# Patient Record
Sex: MALE | Race: ASIAN | NOT HISPANIC OR LATINO
[De-identification: names, ages, dates, MRNs, and addresses within clinical notes are randomized per-mention and may not be internally consistent; named-entity substitution may affect disease eponyms.]

---
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
## 2023-03-08 ENCOUNTER — APPOINTMENT (OUTPATIENT)
Dept: PEDIATRIC NEUROLOGY | Facility: CLINIC | Age: 18
End: 2023-03-08
Payer: MEDICAID

## 2023-03-08 VITALS — BODY MASS INDEX: 37.35 KG/M2 | HEIGHT: 67 IN | WEIGHT: 238 LBS

## 2023-03-08 DIAGNOSIS — F72 SEVERE INTELLECTUAL DISABILITIES: ICD-10-CM

## 2023-03-08 DIAGNOSIS — F84.0 AUTISTIC DISORDER: ICD-10-CM

## 2023-03-08 DIAGNOSIS — R46.89 OTHER SYMPTOMS AND SIGNS INVOLVING APPEARANCE AND BEHAVIOR: ICD-10-CM

## 2023-03-08 PROCEDURE — 99213 OFFICE O/P EST LOW 20 MIN: CPT

## 2023-03-08 NOTE — ASSESSMENT
[FreeTextEntry1] : 16 yo M with autism and history of seizures, seizure-free for ~ 10 years , now off VPA after he developed pancreatitis in Oct 2015\par Now followed by psychiatrist who is managing his medication for his intermittent aggression, self-hurting and most recently, obsessive-compulsive behaviors \par Will continue to follow yearly to screen for problems associated with autism and seizures

## 2023-03-08 NOTE — PHYSICAL EXAM
[Well-appearing] : well-appearing [Alert] : alert [No facial asymmetry or weakness] : no facial asymmetry or weakness [Normal gait] : normal gait [de-identified] : poorly related [de-identified] : non-verbal [de-identified] : responds to name [de-identified] : normal functional strength observed in arm and legs

## 2023-03-08 NOTE — HISTORY OF PRESENT ILLNESS
[FreeTextEntry1] : 17 year old M with autism, prior history of seizures, followed mainly for behavior\par \par Over the past 6 months developed OCD behaviors including compulsive vomiting, moving furniture around and stacking them, insomnia\par - prozac was discontinued, switched with clonidine and clomipramine\par - trazadone was discontinued, switched to Zolpidem \par Not aggressive but has head-banging behavior when frustrated\par \par No seizures \par \par Followed by psychiatry  MARTIN\par Has a caregiver at home\par Parents looking for a residential program as he has not been accepted to [facility] b/c of his behaviors\par \par Meds\par benztropine 0.5 BID\par clonidine 0.1 mg BID\par Risperdal 2 mg BID\par clomipramine 50 mg daily\par zolpidem 10 mg qhs\par hydroxyzine prn for anxiety\par melatonin 10 mg at bedtime

## 2023-05-08 ENCOUNTER — EMERGENCY (EMERGENCY)
Facility: HOSPITAL | Age: 18
LOS: 1 days | Discharge: HOME | End: 2023-05-08
Attending: EMERGENCY MEDICINE | Admitting: EMERGENCY MEDICINE
Payer: MEDICAID

## 2023-05-08 VITALS
DIASTOLIC BLOOD PRESSURE: 96 MMHG | SYSTOLIC BLOOD PRESSURE: 175 MMHG | WEIGHT: 240.08 LBS | TEMPERATURE: 98 F | HEIGHT: 68 IN | HEART RATE: 149 BPM | OXYGEN SATURATION: 97 % | RESPIRATION RATE: 23 BRPM

## 2023-05-08 VITALS
RESPIRATION RATE: 19 BRPM | SYSTOLIC BLOOD PRESSURE: 154 MMHG | DIASTOLIC BLOOD PRESSURE: 68 MMHG | OXYGEN SATURATION: 98 % | HEART RATE: 98 BPM

## 2023-05-08 LAB
ALBUMIN SERPL ELPH-MCNC: 4.1 G/DL — SIGNIFICANT CHANGE UP (ref 3.3–5)
ALP SERPL-CCNC: 74 U/L — SIGNIFICANT CHANGE UP (ref 40–120)
ALT FLD-CCNC: 62 U/L — HIGH (ref 10–45)
ANION GAP SERPL CALC-SCNC: 10 MMOL/L — SIGNIFICANT CHANGE UP (ref 5–17)
AST SERPL-CCNC: 35 U/L — SIGNIFICANT CHANGE UP (ref 10–40)
BASOPHILS # BLD AUTO: 0.02 K/UL — SIGNIFICANT CHANGE UP (ref 0–0.2)
BASOPHILS NFR BLD AUTO: 0.2 % — SIGNIFICANT CHANGE UP (ref 0–2)
BILIRUB SERPL-MCNC: 0.5 MG/DL — SIGNIFICANT CHANGE UP (ref 0.2–1.2)
BUN SERPL-MCNC: 6 MG/DL — LOW (ref 7–23)
CALCIUM SERPL-MCNC: 9.1 MG/DL — SIGNIFICANT CHANGE UP (ref 8.4–10.5)
CHLORIDE SERPL-SCNC: 100 MMOL/L — SIGNIFICANT CHANGE UP (ref 96–108)
CO2 SERPL-SCNC: 27 MMOL/L — SIGNIFICANT CHANGE UP (ref 22–31)
CREAT SERPL-MCNC: 1.02 MG/DL — SIGNIFICANT CHANGE UP (ref 0.5–1.3)
EGFR: 109 ML/MIN/1.73M2 — SIGNIFICANT CHANGE UP
EOSINOPHIL # BLD AUTO: 0.11 K/UL — SIGNIFICANT CHANGE UP (ref 0–0.5)
EOSINOPHIL NFR BLD AUTO: 1.3 % — SIGNIFICANT CHANGE UP (ref 0–6)
GLUCOSE SERPL-MCNC: 88 MG/DL — SIGNIFICANT CHANGE UP (ref 70–99)
HCT VFR BLD CALC: 41.7 % — SIGNIFICANT CHANGE UP (ref 39–50)
HGB BLD-MCNC: 14 G/DL — SIGNIFICANT CHANGE UP (ref 13–17)
IMM GRANULOCYTES NFR BLD AUTO: 0.5 % — SIGNIFICANT CHANGE UP (ref 0–0.9)
LIDOCAIN IGE QN: 74 U/L — SIGNIFICANT CHANGE UP (ref 73–393)
LYMPHOCYTES # BLD AUTO: 2.73 K/UL — SIGNIFICANT CHANGE UP (ref 1–3.3)
LYMPHOCYTES # BLD AUTO: 32.2 % — SIGNIFICANT CHANGE UP (ref 13–44)
MAGNESIUM SERPL-MCNC: 1.8 MG/DL — SIGNIFICANT CHANGE UP (ref 1.6–2.6)
MCHC RBC-ENTMCNC: 25.8 PG — LOW (ref 27–34)
MCHC RBC-ENTMCNC: 33.6 GM/DL — SIGNIFICANT CHANGE UP (ref 32–36)
MCV RBC AUTO: 76.8 FL — LOW (ref 80–100)
MONOCYTES # BLD AUTO: 0.7 K/UL — SIGNIFICANT CHANGE UP (ref 0–0.9)
MONOCYTES NFR BLD AUTO: 8.3 % — SIGNIFICANT CHANGE UP (ref 2–14)
NEUTROPHILS # BLD AUTO: 4.87 K/UL — SIGNIFICANT CHANGE UP (ref 1.8–7.4)
NEUTROPHILS NFR BLD AUTO: 57.5 % — SIGNIFICANT CHANGE UP (ref 43–77)
NRBC # BLD: 0 /100 WBCS — SIGNIFICANT CHANGE UP (ref 0–0)
PHOSPHATE SERPL-MCNC: 4.7 MG/DL — HIGH (ref 2.5–4.5)
PLATELET # BLD AUTO: 345 K/UL — SIGNIFICANT CHANGE UP (ref 150–400)
POTASSIUM SERPL-MCNC: 3.7 MMOL/L — SIGNIFICANT CHANGE UP (ref 3.5–5.3)
POTASSIUM SERPL-SCNC: 3.7 MMOL/L — SIGNIFICANT CHANGE UP (ref 3.5–5.3)
PROT SERPL-MCNC: 7.4 G/DL — SIGNIFICANT CHANGE UP (ref 6–8.3)
RBC # BLD: 5.43 M/UL — SIGNIFICANT CHANGE UP (ref 4.2–5.8)
RBC # FLD: 13.4 % — SIGNIFICANT CHANGE UP (ref 10.3–14.5)
SODIUM SERPL-SCNC: 137 MMOL/L — SIGNIFICANT CHANGE UP (ref 135–145)
WBC # BLD: 8.47 K/UL — SIGNIFICANT CHANGE UP (ref 3.8–10.5)
WBC # FLD AUTO: 8.47 K/UL — SIGNIFICANT CHANGE UP (ref 3.8–10.5)

## 2023-05-08 PROCEDURE — 36415 COLL VENOUS BLD VENIPUNCTURE: CPT

## 2023-05-08 PROCEDURE — 80053 COMPREHEN METABOLIC PANEL: CPT

## 2023-05-08 PROCEDURE — 85025 COMPLETE CBC W/AUTO DIFF WBC: CPT

## 2023-05-08 PROCEDURE — 96372 THER/PROPH/DIAG INJ SC/IM: CPT

## 2023-05-08 PROCEDURE — 84100 ASSAY OF PHOSPHORUS: CPT

## 2023-05-08 PROCEDURE — 99284 EMERGENCY DEPT VISIT MOD MDM: CPT

## 2023-05-08 PROCEDURE — 99283 EMERGENCY DEPT VISIT LOW MDM: CPT

## 2023-05-08 PROCEDURE — 83690 ASSAY OF LIPASE: CPT

## 2023-05-08 PROCEDURE — 83735 ASSAY OF MAGNESIUM: CPT

## 2023-05-08 RX ORDER — HALOPERIDOL DECANOATE 100 MG/ML
5 INJECTION INTRAMUSCULAR ONCE
Refills: 0 | Status: COMPLETED | OUTPATIENT
Start: 2023-05-08 | End: 2023-05-08

## 2023-05-08 RX ORDER — DIPHENHYDRAMINE HCL 50 MG
50 CAPSULE ORAL ONCE
Refills: 0 | Status: COMPLETED | OUTPATIENT
Start: 2023-05-08 | End: 2023-05-08

## 2023-05-08 RX ADMIN — Medication 2 MILLIGRAM(S): at 21:47

## 2023-05-08 RX ADMIN — HALOPERIDOL DECANOATE 5 MILLIGRAM(S): 100 INJECTION INTRAMUSCULAR at 21:47

## 2023-05-08 RX ADMIN — Medication 50 MILLIGRAM(S): at 21:46

## 2023-05-08 NOTE — ED ADULT TRIAGE NOTE - CHIEF COMPLAINT QUOTE
BIB parents from home for c/o "drinking a lot of water." pt autistic and nonverbal, pt very aggressive and agitated on arrival, hitting himself him the head and pushing parents. pt mother also states pts psychatrist told them to come to ED for blood work for adjustment of medications.

## 2023-05-08 NOTE — ED ADULT NURSE NOTE - OBJECTIVE STATEMENT
BIB parents from home for c/o excessive water drinking.  pt autistic and nonverbal, pt very aggressive and agitated on arrival. Hitting himself him the head and pushing parents. pt mother states pts psychatrist told them to come to ED for blood work. Concern for hyponatremia. Pt behavior is at baseline. When frustrated, he hits himself and charges at others.

## 2023-05-08 NOTE — ED PROVIDER NOTE - OBJECTIVE STATEMENT
17M BIB parents from home for c/o excessive water drinking.  pt autistic and nonverbal, pt very aggressive and agitated on arrival. Hitting himself him the head and pushing parents. pt mother states pts psychatrist told them to come to ED for blood work. Concern for hyponatremia. Pt behavior is at baseline. When frustrated, he hits himself and charges at others.

## 2023-05-08 NOTE — ED PROVIDER NOTE - PATIENT PORTAL LINK FT
You can access the FollowMyHealth Patient Portal offered by [facility] by registering at the following website: [email or web address]. By joining [facility]’s FollowMyHealth portal, you will also be able to view your health information using other applications (apps) compatible with our system.

## 2023-05-08 NOTE — ED PROVIDER NOTE - PHYSICAL EXAMINATION
Patient is hitting himself in face. Parents state this is baseline.   PERRL.   Lungs clear.   No sign of trauma.

## 2023-05-08 NOTE — ED PROVIDER NOTE - CLINICAL SUMMARY MEDICAL DECISION MAKING FREE TEXT BOX
17M BIB parents from home for c/o excessive water drinking.  pt autistic and nonverbal, pt very aggressive and agitated on arrival. Hitting himself him the head and pushing parents. pt mother states pts psychatrist told them to come to ED for blood work. Concern for hyponatremia. Pt behavior is at baseline. When frustrated, he hits himself and charges at others.   Exam as stated.   Full labs with electrolytes sent.   No acute findings. D/W Parents.   They will f/u with psychiatrist to revisit his med regimen. Copy of results provided.   Worsening, continued or ANY new concerning symptoms return to the emergency department.

## 2023-05-10 ENCOUNTER — APPOINTMENT (OUTPATIENT)
Dept: PEDIATRIC NEUROLOGY | Facility: CLINIC | Age: 18
End: 2023-05-10

## 2023-06-12 ENCOUNTER — APPOINTMENT (OUTPATIENT)
Dept: ENDOCRINOLOGY | Facility: CLINIC | Age: 18
End: 2023-06-12
Payer: MEDICAID

## 2023-06-12 VITALS
BODY MASS INDEX: 40.18 KG/M2 | WEIGHT: 256 LBS | DIASTOLIC BLOOD PRESSURE: 64 MMHG | SYSTOLIC BLOOD PRESSURE: 98 MMHG | OXYGEN SATURATION: 97 % | HEIGHT: 67 IN | TEMPERATURE: 97.2 F | HEART RATE: 130 BPM

## 2023-06-12 DIAGNOSIS — R73.03 PREDIABETES.: ICD-10-CM

## 2023-06-12 DIAGNOSIS — E66.9 OBESITY, UNSPECIFIED: ICD-10-CM

## 2023-06-12 LAB
GLUCOSE BLDC GLUCOMTR-MCNC: 144
HBA1C MFR BLD HPLC: 5.7

## 2023-06-12 PROCEDURE — 82962 GLUCOSE BLOOD TEST: CPT

## 2023-06-12 PROCEDURE — 83036 HEMOGLOBIN GLYCOSYLATED A1C: CPT | Mod: QW

## 2023-06-12 PROCEDURE — ZZZZZ: CPT

## 2023-06-12 PROCEDURE — 99214 OFFICE O/P EST MOD 30 MIN: CPT

## 2023-06-12 NOTE — ASSESSMENT
[FreeTextEntry1] : #Pre-diabetes \par -His hemoglobin A1c is 5.7% today which just puts him into the prediabetic range.\par -His weight has gone up significantly since last visit which is likely due to his eating habits.\par Plan:\par -Patient will see nutritionist today to discuss change in snacking that could be contributing to the rise of A1c. \par -Will need to also check CMP, TSH, Lipid panel\par \par #Obesity\par -BMI has improved from 35 to 32.\par -Given history of pancreatitis, cannot start agent such as GLP-1's\par \par #History of nonalcoholic fatty liver disease\par -Continue with lifestyle interventions.  Patient is following with GI regarding NAFLD

## 2023-06-12 NOTE — HISTORY OF PRESENT ILLNESS
[FreeTextEntry1] : History of diabetes type 2, obesity\par Other medical problems: Severe autism, recurrent pancreatitis, nonalcoholic fatty liver disease\par \par History of type 2 diabetes/obesity\par - This is a 17-year-old patient with previous history of type 2 diabetes.  Patient has severe autism and behavioral disorders. \par -Previous visit, patient had lost weight due to vomiting however he is now eating more than he was previously and his weight has increased.  He has went up nearly 40 pounds since last visit.\par -His father is with him during the appointment and explains that he has not been very consistent on diet and likes to snack a lot.  He will eat what he likes due to his behavioral issues\par -They were checking blood sugars at home fasting and they are typically between .\par -Hemoglobin A1c checked today is 5.7%\par \par \par

## 2023-06-13 PROBLEM — R73.03 PRE-DIABETES: Status: ACTIVE | Noted: 2023-06-12

## 2023-07-07 ENCOUNTER — NON-APPOINTMENT (OUTPATIENT)
Age: 18
End: 2023-07-07

## 2023-08-28 PROBLEM — F84.0 AUTISTIC DISORDER: Chronic | Status: ACTIVE | Noted: 2023-05-08

## 2023-08-28 RX ORDER — CHOLECALCIFEROL (VITAMIN D3) 10(400)/ML
DROPS ORAL
Qty: 180 | Refills: 0 | Status: ACTIVE | COMMUNITY
Start: 2023-08-28 | End: 1900-01-01

## 2023-08-28 RX ORDER — LANCING DEVICE
EACH MISCELLANEOUS
Qty: 180 | Refills: 2 | Status: ACTIVE | COMMUNITY
Start: 2023-08-28 | End: 1900-01-01

## 2023-08-28 RX ORDER — ISOPROPYL ALCOHOL 70 ML/100ML
SWAB TOPICAL
Qty: 1 | Refills: 1 | Status: ACTIVE | COMMUNITY
Start: 2023-08-28 | End: 1900-01-01

## 2023-08-28 RX ORDER — LANCING DEVICE
W/DEVICE EACH MISCELLANEOUS
Qty: 1 | Refills: 1 | Status: ACTIVE | COMMUNITY
Start: 2023-08-28 | End: 1900-01-01

## 2023-09-01 RX ORDER — BLOOD SUGAR DIAGNOSTIC
STRIP MISCELLANEOUS
Qty: 100 | Refills: 1 | Status: ACTIVE | COMMUNITY
Start: 2017-08-03 | End: 1900-01-01

## 2023-11-07 ENCOUNTER — EMERGENCY (EMERGENCY)
Facility: HOSPITAL | Age: 18
LOS: 1 days | Discharge: HOME | End: 2023-11-07
Attending: EMERGENCY MEDICINE | Admitting: STUDENT IN AN ORGANIZED HEALTH CARE EDUCATION/TRAINING PROGRAM
Payer: MEDICAID

## 2023-11-07 VITALS
OXYGEN SATURATION: 99 % | WEIGHT: 210.1 LBS | DIASTOLIC BLOOD PRESSURE: 80 MMHG | TEMPERATURE: 97 F | SYSTOLIC BLOOD PRESSURE: 148 MMHG | HEIGHT: 69 IN | RESPIRATION RATE: 20 BRPM | HEART RATE: 99 BPM

## 2023-11-07 PROCEDURE — 99284 EMERGENCY DEPT VISIT MOD MDM: CPT | Mod: 25

## 2023-11-07 PROCEDURE — 90471 IMMUNIZATION ADMIN: CPT

## 2023-11-07 PROCEDURE — 12001 RPR S/N/AX/GEN/TRNK 2.5CM/<: CPT

## 2023-11-07 PROCEDURE — 90715 TDAP VACCINE 7 YRS/> IM: CPT

## 2023-11-07 PROCEDURE — 99283 EMERGENCY DEPT VISIT LOW MDM: CPT | Mod: 25

## 2023-11-07 RX ORDER — TETANUS TOXOID, REDUCED DIPHTHERIA TOXOID AND ACELLULAR PERTUSSIS VACCINE, ADSORBED 5; 2.5; 8; 8; 2.5 [IU]/.5ML; [IU]/.5ML; UG/.5ML; UG/.5ML; UG/.5ML
0.5 SUSPENSION INTRAMUSCULAR ONCE
Refills: 0 | Status: COMPLETED | OUTPATIENT
Start: 2023-11-07 | End: 2023-11-07

## 2023-11-07 RX ADMIN — TETANUS TOXOID, REDUCED DIPHTHERIA TOXOID AND ACELLULAR PERTUSSIS VACCINE, ADSORBED 0.5 MILLILITER(S): 5; 2.5; 8; 8; 2.5 SUSPENSION INTRAMUSCULAR at 21:17

## 2023-11-07 NOTE — ED ADULT NURSE NOTE - NSFALLUNIVINTERV_ED_ALL_ED
Bed/Stretcher in lowest position, wheels locked, appropriate side rails in place/Call bell, personal items and telephone in reach/Instruct patient to call for assistance before getting out of bed/chair/stretcher/Non-slip footwear applied when patient is off stretcher/[place] to call system/Physically safe environment - no spills, clutter or unnecessary equipment/Purposeful proactive rounding/Room/bathroom lighting operational, light cord in reach

## 2023-11-07 NOTE — ED PROVIDER NOTE - PATIENT PORTAL LINK FT
You can access the FollowMyHealth Patient Portal offered by [facility] by registering at the following website: [email or web address]. By joining [email or web address]’s FollowMyHealth portal, you will also be able to view your health information using other applications (apps) compatible with our system.

## 2023-11-07 NOTE — ED PROVIDER NOTE - PHYSICAL EXAMINATION
Gen: Well appearing in NAD  Head: NC/AT  Neck: trachea midline  Resp:  No distress  Ext: Left index finger: FROM, strength 5/5  Neuro:  A&O appears non focal  Skin:  Left index finger: radial side 2cm laceration through skin wo tendon injury over DIP  Psych:  Normal affect and mood

## 2023-11-07 NOTE — ED PROVIDER NOTE - NS ED ATTENDING STATEMENT MOD
This was a shared visit with the NELLA. I reviewed and verified the documentation and independently performed the documented:

## 2023-11-07 NOTE — ED ADULT NURSE NOTE - NSICDXPASTMEDICALHX_GEN_ALL_CORE_FT
PAST MEDICAL HISTORY:  Autism     Autism     Constipation, unspecified constipation type     Pancreatitis, acute     Seizure disorder

## 2023-11-07 NOTE — ED PROVIDER NOTE - ATTENDING APP SHARED VISIT CONTRIBUTION OF CARE
Pt seen and examined, I agree with acp's management of dermabond for laceration, no foreign body, no tendon involvement.

## 2023-11-07 NOTE — ED ADULT NURSE NOTE - OBJECTIVE STATEMENT
no Pt BIB parents c/o laceration to left second finger from plastic. Pt at baseline mental status. Unable to obtain subjective hx.

## 2023-11-07 NOTE — ED ADULT TRIAGE NOTE - CHIEF COMPLAINT QUOTE
Patient presents to ED from home with laceration to left index finger sustained while cutting plastic. Patient has hx of GDD/Autism, nonverbal. Unable to obtain vitals in triage due to behavior.

## 2023-11-07 NOTE — ED PROVIDER NOTE - CLINICAL SUMMARY MEDICAL DECISION MAKING FREE TEXT BOX
Patient 16 yo m hx of GDD/Autism, nonverbal. presents to ED from home with laceration to left index finger sustained while cutting plastic. pt agitated and aggressive  discussed repair with parents and would like steri strips and dermabond. has had a similar laceration previously and it healed wo intervention   Discussed with parents need to return to ED if symptoms don't continue to improve or recur or develops any new or worsening symptoms that are of concern.

## 2023-11-07 NOTE — ED PROVIDER NOTE - OBJECTIVE STATEMENT
Patient 18 yo m hx of GDD/Autism, nonverbal. presents to ED from home with laceration to left index finger sustained while cutting plastic. pt agitated and aggressive

## 2024-01-16 ENCOUNTER — APPOINTMENT (OUTPATIENT)
Dept: PEDIATRIC NEUROLOGY | Facility: CLINIC | Age: 19
End: 2024-01-16

## 2024-01-24 ENCOUNTER — APPOINTMENT (OUTPATIENT)
Dept: ENDOCRINOLOGY | Facility: CLINIC | Age: 19
End: 2024-01-24

## 2024-06-13 NOTE — ED ADULT NURSE NOTE - ISOLATION TYPE:
What Type Of Note Output Would You Prefer (Optional)?: Bullet Format Is This A New Presentation, Or A Follow-Up?: Rash Additional History: What appears to be a patch of pimple that wont pop. Pt was given steroid cream by Pcp that did not help. None

## 2024-09-07 ENCOUNTER — NON-APPOINTMENT (OUTPATIENT)
Age: 19
End: 2024-09-07